# Patient Record
Sex: FEMALE | Race: WHITE | NOT HISPANIC OR LATINO | URBAN - METROPOLITAN AREA
[De-identification: names, ages, dates, MRNs, and addresses within clinical notes are randomized per-mention and may not be internally consistent; named-entity substitution may affect disease eponyms.]

---

## 2019-06-02 ENCOUNTER — EMERGENCY (EMERGENCY)
Facility: HOSPITAL | Age: 54
LOS: 1 days | Discharge: HOME | End: 2019-06-02
Attending: EMERGENCY MEDICINE
Payer: COMMERCIAL

## 2019-06-02 VITALS
SYSTOLIC BLOOD PRESSURE: 127 MMHG | WEIGHT: 145.06 LBS | HEIGHT: 64 IN | OXYGEN SATURATION: 97 % | RESPIRATION RATE: 18 BRPM | TEMPERATURE: 97 F | DIASTOLIC BLOOD PRESSURE: 81 MMHG | HEART RATE: 89 BPM

## 2019-06-02 PROCEDURE — 10060 I&D ABSCESS SIMPLE/SINGLE: CPT

## 2019-06-02 PROCEDURE — 99283 EMERGENCY DEPT VISIT LOW MDM: CPT | Mod: 25

## 2019-06-02 RX ORDER — ACETAMINOPHEN 500 MG
650 TABLET ORAL ONCE
Refills: 0 | Status: COMPLETED | OUTPATIENT
Start: 2019-06-02 | End: 2019-06-02

## 2019-06-02 RX ORDER — HYDROCHLOROTHIAZIDE 25 MG
0 TABLET ORAL
Qty: 0 | Refills: 0 | DISCHARGE

## 2019-06-02 RX ORDER — LEVOTHYROXINE SODIUM 125 MCG
0 TABLET ORAL
Qty: 0 | Refills: 0 | DISCHARGE

## 2019-06-02 RX ORDER — AZTREONAM 2 G
2 VIAL (EA) INJECTION
Qty: 40 | Refills: 0
Start: 2019-06-02 | End: 2019-06-11

## 2019-06-02 RX ADMIN — Medication 650 MILLIGRAM(S): at 03:51

## 2019-06-02 RX ADMIN — Medication 2 TABLET(S): at 03:51

## 2019-06-02 RX ADMIN — Medication 650 MILLIGRAM(S): at 03:50

## 2019-06-02 NOTE — ED PROVIDER NOTE - PROGRESS NOTE DETAILS
no pus expressed with I and D'd  will give bactrim DS BID and discussed warm soaks several times per day and keeping foot elevated as much as possible  gave strict instrugions to return immediately if any worse or concerning symptoms

## 2019-06-02 NOTE — ED PROVIDER NOTE - OBJECTIVE STATEMENT
pt noted pain and swelling to her toe for one day then noted redness going up her foot so came to the ED  no fever no chills  was at the nail salon last week

## 2019-06-02 NOTE — ED ADULT NURSE NOTE - NSIMPLEMENTINTERV_GEN_ALL_ED
Implemented All Universal Safety Interventions:  [place] to call system. Call bell, personal items and telephone within reach. Instruct patient to call for assistance. Room bathroom lighting operational. Non-slip footwear when patient is off stretcher. Physically safe environment: no spills, clutter or unnecessary equipment. Stretcher in lowest position, wheels locked, appropriate side rails in place.

## 2019-06-02 NOTE — ED PROVIDER NOTE - PHYSICAL EXAMINATION
right first toe with redness and swelling  possible paronychia although no discrete fluctuance noted  red on dorsum of foot to mid foot  neuro vasc intact

## 2019-06-02 NOTE — ED ADULT NURSE NOTE - CHPI ED NUR SYMPTOMS NEG
no chills/no vomiting/no fever/no decreased eating/drinking/no weakness/no nausea/no dizziness/no tingling

## 2019-06-02 NOTE — ED PROVIDER NOTE - CLINICAL SUMMARY MEDICAL DECISION MAKING FREE TEXT BOX
pt with cellulitis and possible paronychia  will attempt I and D and start bactrim DS BID to cover MRSA and SA.   pt given instrutions to soak for four times per day, keep foot elevated and to take antibioitcs as prescribed.   given strict instructions to return if fever, chills worsening redness or pain or any new or concerning symptoms

## 2020-06-30 NOTE — ED ADULT NURSE NOTE - INTEGUMENTARY WDL
Color consistent with ethnicity/race, warm, dry intact, resilient.
No deformity or limitation of movement

## 2020-07-28 NOTE — ED PROVIDER NOTE - QUALITY
Received bedside shift report from Diego DAMIAN RN regarding patient and assumed care. Patient awake, calm and stable, currently positioned in bed for comfort and safety; call light within reach. Denies pain or discomfort at this time. Will continue to monitor.      throbbing/burning

## 2024-01-08 ENCOUNTER — NON-APPOINTMENT (OUTPATIENT)
Age: 59
End: 2024-01-08